# Patient Record
Sex: MALE | Race: WHITE | Employment: PART TIME | URBAN - METROPOLITAN AREA
[De-identification: names, ages, dates, MRNs, and addresses within clinical notes are randomized per-mention and may not be internally consistent; named-entity substitution may affect disease eponyms.]

---

## 2020-05-05 ENCOUNTER — OFFICE VISIT (OUTPATIENT)
Dept: PRIMARY CARE CLINIC | Age: 20
End: 2020-05-05
Payer: COMMERCIAL

## 2020-05-05 VITALS — OXYGEN SATURATION: 99 % | TEMPERATURE: 98.1 F | HEART RATE: 72 BPM

## 2020-05-05 PROCEDURE — 99212 OFFICE O/P EST SF 10 MIN: CPT | Performed by: NURSE PRACTITIONER

## 2020-05-05 NOTE — PATIENT INSTRUCTIONS
have a medical emergency and need to call 911, notify the dispatch personnel that you have, or are being evaluated for COVID-19. If possible, put on a facemask before emergency medical services arrive. Discontinuing home isolation  Patients with confirmed COVID-19 should remain under home isolation precautions until the risk of secondary transmission to others is thought to be low. The decision to discontinue home isolation precautions should be made on a case-by-case basis, in consultation with healthcare providers and state and local health departments. Thank you for enrolling in [address] Please follow the instructions below to securely access your online medical record. [employer] allows you to send messages to your doctor, view your test results, renew your prescriptions, schedule appointments, and more. How Do I Sign Up? 1. In your Internet browser, go to [email or web address]  2. Click on the Sign Up Now link in the Sign In box. You will see the New Member Sign Up page. 3. Enter your [employer] Access Code exactly as it appears below. You will not need to use this code after youve completed the sign-up process. If you do not sign up before the expiration date, you must request a new code. [employer] Access Code: [email or web address]  Expires: 6/19/2020  9:28 AM    4. Enter your Social Security Number ([id number]) and Date of Birth (mm/dd/yyyy) as indicated and click Submit. You will be taken to the next sign-up page. 5. Create a [employer] ID. This will be your [employer] login ID and cannot be changed, so think of one that is secure and easy to remember. 6. Create a [employer] password. You can change your password at any time. 7. Enter your Password Reset Question and Answer. This can be used at a later time if you forget your password. 8. Enter your e-mail address. You will receive e-mail notification when new information is available in [address] 9. Click Sign Up.  You can now view your

## 2020-05-05 NOTE — PROGRESS NOTES
for:  [] Flu management  [] Strep throat management  [x] Possible COVID-19 exposure with self-quarantine   [] Possible UZQNT-33 with isolation instructions and management of symptoms  [] Follow-up with primary care physician or emergency department if worsens  [] Referred to emergency department for evaluation    [x] Evaluation per physician/nurse practitioner in clinic  [] Prescription sent in  [] No Prescription sent in    An  electronic signature was used to authenticate this note.      --MARIAMA Paulino - CNP on 5/5/2020 at 10:07 AM

## 2020-05-06 LAB
SARS-COV-2: NOT DETECTED
SOURCE: NORMAL